# Patient Record
Sex: FEMALE
[De-identification: names, ages, dates, MRNs, and addresses within clinical notes are randomized per-mention and may not be internally consistent; named-entity substitution may affect disease eponyms.]

---

## 2018-11-14 ENCOUNTER — RESULT REVIEW (OUTPATIENT)
Age: 23
End: 2018-11-14

## 2019-11-22 ENCOUNTER — RESULT REVIEW (OUTPATIENT)
Age: 24
End: 2019-11-22

## 2020-04-26 ENCOUNTER — MESSAGE (OUTPATIENT)
Age: 25
End: 2020-04-26

## 2020-05-05 LAB
SARS-COV-2 IGG SERPL IA-ACNC: 0.3 RATIO
SARS-COV-2 IGG SERPL QL IA: NEGATIVE

## 2020-05-11 ENCOUNTER — APPOINTMENT (OUTPATIENT)
Dept: DISASTER EMERGENCY | Facility: HOSPITAL | Age: 25
End: 2020-05-11

## 2022-12-06 ENCOUNTER — NON-APPOINTMENT (OUTPATIENT)
Age: 27
End: 2022-12-06

## 2023-06-08 ENCOUNTER — NON-APPOINTMENT (OUTPATIENT)
Age: 28
End: 2023-06-08

## 2023-07-27 ENCOUNTER — NON-APPOINTMENT (OUTPATIENT)
Age: 28
End: 2023-07-27

## 2023-08-22 ENCOUNTER — APPOINTMENT (OUTPATIENT)
Dept: OTOLARYNGOLOGY | Facility: CLINIC | Age: 28
End: 2023-08-22
Payer: COMMERCIAL

## 2023-08-22 DIAGNOSIS — H60.543 ACUTE ECZEMATOID OTITIS EXTERNA, BILATERAL: ICD-10-CM

## 2023-08-22 DIAGNOSIS — H93.8X3 OTHER SPECIFIED DISORDERS OF EAR, BILATERAL: ICD-10-CM

## 2023-08-22 DIAGNOSIS — Z78.9 OTHER SPECIFIED HEALTH STATUS: ICD-10-CM

## 2023-08-22 DIAGNOSIS — H61.23 IMPACTED CERUMEN, BILATERAL: ICD-10-CM

## 2023-08-22 PROBLEM — Z00.00 ENCOUNTER FOR PREVENTIVE HEALTH EXAMINATION: Status: ACTIVE | Noted: 2023-08-22

## 2023-08-22 PROCEDURE — 99204 OFFICE O/P NEW MOD 45 MIN: CPT | Mod: 25

## 2023-08-22 PROCEDURE — 92557 COMPREHENSIVE HEARING TEST: CPT

## 2023-08-22 PROCEDURE — 92567 TYMPANOMETRY: CPT

## 2023-08-22 PROCEDURE — G0268 REMOVAL OF IMPACTED WAX MD: CPT

## 2023-08-22 RX ORDER — FLUOCINOLONE ACETONIDE 0.25 MG/G
0.03 OINTMENT TOPICAL TWICE DAILY
Qty: 1 | Refills: 0 | Status: ACTIVE | COMMUNITY
Start: 2023-08-22 | End: 1900-01-01

## 2023-08-22 NOTE — ASSESSMENT
[FreeTextEntry1] : Patient with clogged ears bilat - has had cerumen issues in past.  Has bilat impacted cerumen today and eczema of eac.  Audio normal after removal.  Recommended use of flucinolone ointment and follouwp in 3 mos and as necessary

## 2023-08-22 NOTE — PHYSICAL EXAM
[Midline] : trachea located in midline position [Normal] : no rashes [de-identified] : bilat impacted cerumen - eczcema eac bilat  [de-identified] : after cerumen removal

## 2023-08-22 NOTE — HISTORY OF PRESENT ILLNESS
[de-identified] : c/o feels ears clogged bilaterally - problem since 9 mos - has had cerumen in ears removed at [facility] in past.  No other hx of ear problems.   No recent q tip use.